# Patient Record
Sex: FEMALE | Race: WHITE | NOT HISPANIC OR LATINO | Employment: OTHER | URBAN - NONMETROPOLITAN AREA
[De-identification: names, ages, dates, MRNs, and addresses within clinical notes are randomized per-mention and may not be internally consistent; named-entity substitution may affect disease eponyms.]

---

## 2022-12-01 ENCOUNTER — LAB (OUTPATIENT)
Dept: LAB | Facility: HOSPITAL | Age: 42
End: 2022-12-01

## 2022-12-01 ENCOUNTER — OFFICE VISIT (OUTPATIENT)
Dept: OBSTETRICS AND GYNECOLOGY | Facility: CLINIC | Age: 42
End: 2022-12-01

## 2022-12-01 VITALS — SYSTOLIC BLOOD PRESSURE: 120 MMHG | WEIGHT: 142 LBS | DIASTOLIC BLOOD PRESSURE: 74 MMHG

## 2022-12-01 DIAGNOSIS — N93.9 ABNORMAL UTERINE BLEEDING (AUB): ICD-10-CM

## 2022-12-01 DIAGNOSIS — N88.2 CERVICAL STENOSIS (UTERINE CERVIX): ICD-10-CM

## 2022-12-01 DIAGNOSIS — N80.9 ENDOMETRIOSIS DETERMINED BY LAPAROSCOPY: ICD-10-CM

## 2022-12-01 DIAGNOSIS — N93.9 ABNORMAL UTERINE BLEEDING (AUB): Primary | ICD-10-CM

## 2022-12-01 LAB
BASOPHILS # BLD AUTO: 0.04 10*3/MM3 (ref 0–0.2)
BASOPHILS NFR BLD AUTO: 0.5 % (ref 0–1.5)
DEPRECATED RDW RBC AUTO: 37.7 FL (ref 37–54)
EOSINOPHIL # BLD AUTO: 0.15 10*3/MM3 (ref 0–0.4)
EOSINOPHIL NFR BLD AUTO: 1.9 % (ref 0.3–6.2)
ERYTHROCYTE [DISTWIDTH] IN BLOOD BY AUTOMATED COUNT: 12.3 % (ref 12.3–15.4)
HCT VFR BLD AUTO: 39.5 % (ref 34–46.6)
HGB BLD-MCNC: 13.4 G/DL (ref 12–15.9)
IMM GRANULOCYTES # BLD AUTO: 0.01 10*3/MM3 (ref 0–0.05)
IMM GRANULOCYTES NFR BLD AUTO: 0.1 % (ref 0–0.5)
LYMPHOCYTES # BLD AUTO: 2.2 10*3/MM3 (ref 0.7–3.1)
LYMPHOCYTES NFR BLD AUTO: 28.4 % (ref 19.6–45.3)
MCH RBC QN AUTO: 28 PG (ref 26.6–33)
MCHC RBC AUTO-ENTMCNC: 33.9 G/DL (ref 31.5–35.7)
MCV RBC AUTO: 82.6 FL (ref 79–97)
MONOCYTES # BLD AUTO: 0.59 10*3/MM3 (ref 0.1–0.9)
MONOCYTES NFR BLD AUTO: 7.6 % (ref 5–12)
NEUTROPHILS NFR BLD AUTO: 4.77 10*3/MM3 (ref 1.7–7)
NEUTROPHILS NFR BLD AUTO: 61.5 % (ref 42.7–76)
NRBC BLD AUTO-RTO: 0 /100 WBC (ref 0–0.2)
PLATELET # BLD AUTO: 292 10*3/MM3 (ref 140–450)
PMV BLD AUTO: 10.2 FL (ref 6–12)
RBC # BLD AUTO: 4.78 10*6/MM3 (ref 3.77–5.28)
TSH SERPL DL<=0.05 MIU/L-ACNC: 1.74 UIU/ML (ref 0.27–4.2)
WBC NRBC COR # BLD: 7.76 10*3/MM3 (ref 3.4–10.8)

## 2022-12-01 PROCEDURE — 36415 COLL VENOUS BLD VENIPUNCTURE: CPT

## 2022-12-01 PROCEDURE — 85025 COMPLETE CBC W/AUTO DIFF WBC: CPT

## 2022-12-01 PROCEDURE — 99243 OFF/OP CNSLTJ NEW/EST LOW 30: CPT | Performed by: OBSTETRICS & GYNECOLOGY

## 2022-12-01 PROCEDURE — 84443 ASSAY THYROID STIM HORMONE: CPT

## 2022-12-01 RX ORDER — ATORVASTATIN CALCIUM 10 MG/1
TABLET, FILM COATED ORAL
COMMUNITY
Start: 2022-11-24

## 2022-12-01 RX ORDER — TRANEXAMIC ACID 650 MG/1
1300 TABLET ORAL 3 TIMES DAILY
Qty: 30 TABLET | Refills: 11 | Status: SHIPPED | OUTPATIENT
Start: 2022-12-01 | End: 2022-12-06

## 2022-12-01 RX ORDER — MISOPROSTOL 200 UG/1
TABLET ORAL
Qty: 2 TABLET | Refills: 0 | Status: SHIPPED | OUTPATIENT
Start: 2022-12-01 | End: 2022-12-22

## 2022-12-02 NOTE — PROGRESS NOTES
[facility]  Gynecology Consult  Date of Service: 2022  Referring provider: MATTHEW Martinez    CC: Endometriosis    HPI  Leanne Ritchie is a 42 y.o.  premenopausal female who presents as a referral from MATTHEW Martinez secondary to endometriosis.    She states that she has had bad periods since she started at age 11.  She states that during her 1st surgery, endometriosis was confirmed.  She states that she did take medications for quite some time (DepoProvera, DepoLupron, BCP) but none helped; she states that the only thing that helped was Vioxx which was removed from the market.  She states that during the first 3-4 days of her period, she has debilitating bleeding.  She states that she will use a super tampon every 15 minutes.  She states that she has to set an alarm at night to wake up to change so that she doesn't saturate clothes or sheets.  She states that this has interrupted her work schedule.    She does have some pain with her periods but nothing compared to her bleeding.    ROS  Review of Systems   Constitutional: Negative.    HENT: Negative.    Eyes: Negative.    Respiratory: Negative.    Cardiovascular: Negative.    Gastrointestinal: Negative.    Endocrine: Negative.    Genitourinary: Positive for menstrual problem, pelvic pain and vaginal bleeding.   Musculoskeletal: Negative.    Skin: Negative.    Allergic/Immunologic: Negative.    Neurological: Negative.    Hematological: Negative.    Psychiatric/Behavioral: Negative.      GYN HISTORY  Menarche: age 11  Menses: Regular, every 28 days, lasts 5 days, first 3 days ++ heavy, no intermenstrual bleeding  History of STIs: None  Last pap smear:   Abnormal pap smear history: None  Contraception: None     OB HISTORY  OB History    Para Term  AB Living   10 1 1   9 1   SAB IAB Ectopic Molar Multiple Live Births   9         1      # Outcome Date GA Lbr Prateek/2nd Weight Sex Delivery Anes PTL Lv   10 Term  06 38w0d  3345 g (7 lb 6 oz) M CS-LTranv   MAICOL      Complications: Breech presentation   9 SAB            8 SAB            7 SAB            6 SAB            5 SAB            4 SAB            3 SAB            2 SAB            1 SAB              PAST MEDICAL HISTORY  Past Medical History:   Diagnosis Date   • Basal cell carcinoma    • Endometriosis determined by laparoscopy    • Hyperlipidemia    • Migraine    • PONV (postoperative nausea and vomiting)    • Varicella      PAST SURGICAL HISTORY  Past Surgical History:   Procedure Laterality Date   •  SECTION     • PELVIC LAPAROSCOPY     • PELVIC LAPAROSCOPY     • TONSILLECTOMY     • WISDOM TOOTH EXTRACTION       FAMILY HISTORY  Family History   Problem Relation Age of Onset   • Cancer Father    • Stomach cancer Father         GIST cancer   • Breast cancer Mother         mammary/ductal   • Stroke Mother    • Lupus Sister    • Endometriosis Sister    • Irritable bowel syndrome Son    • Eczema Son    • Ovarian cancer Neg Hx    • Uterine cancer Neg Hx      SOCIAL HISTORY  Social History     Socioeconomic History   • Marital status:    Tobacco Use   • Smoking status: Never   Vaping Use   • Vaping Use: Never used   Substance and Sexual Activity   • Alcohol use: Never   • Drug use: Never   • Sexual activity: Yes     Partners: Male     Birth control/protection: None     ALLERGIES  Allergies   Allergen Reactions   • Codeine Rash   • Tetracycline Palpitations   • Aspirin Other (See Comments)     Nose bleeds if taken for long periods of time pt reports     HOME MEDICATIONS  Prior to Admission medications    Medication Sig Start Date End Date Taking? Authorizing Provider   atorvastatin (LIPITOR) 10 MG tablet TAKE 1 TABLET BY MOUTH EVERY DAY FOR 90 DAYS 22  Yes Provider, Franci, MD RUTH  /74 (BP Location: Left arm, Patient Position: Sitting, Cuff Size: Adult)   Wt 64.4 kg (142 lb)   LMP 2022 (Exact Date)  "       General: Alert, healthy, no distress, well nourished and well developed.  Neurologic: Alert, oriented to person, place, and time.  Gait normal.  Cranial nerves II-XII grossly intact.  HEENT: Normocephalic, atraumatic.  Extraocular muscles intact, pupils equal and reactive times two.    Neck: Supple, no adenopathy, thyroid normal size, non-tender, without nodularity, trachea midline.  Lungs: Normal respiratory effort.  Clear to auscultation bilaterally.  No wheezes, rhonci, or rales.  Heart: Regular rate and rhythm.  No murmer, rub or gallop.  Abdomen: Soft, non-tender, non-distended,no masses, no hepatosplenomegaly, no hernia.  Skin: No rash, no lesions.  Extremities: No cyanosis, clubbing or edema.  PELVIC EXAM: Deferred to EMB    IMPRESSION  Leanne Ritchie is a 42 y.o.  presenting with AUB-E with prior endometriosis diagnosis.    PLAN    1. Abnormal uterine bleeding (AUB)  Discussed options for management of AUB including expectant management, medical management (OCPs, POPs, DepoProvera, Nexplanon, Mirena IUD, Orilissa, Myfembree), and surgical management (endometrial ablation, hysterectomy).  Reviewed pros/cons of each.  Declines hormonal management.  She is interested in TXA to take to \"lessen her periods.\"  - CBC & Differential; Future  - TSH; Future  - US Non-ob Transvaginal; Future  - Tranexamic Acid (Lysteda) 650 MG tablet; Take 2 tablets by mouth 3 (Three) Times a Day for 5 days.  Dispense: 30 tablet; Refill: 11  - RTC for TVUS/EMB    2. Endometriosis determined by laparoscopy    3. Cervical stenosis (uterine cervix)  - miSOPROStol (Cytotec) 200 MCG tablet; Place 1 tablet in the vagina 8 hours prior to biopsy; place 1 tablet in the vagina 4 hours prior to biopsy.  Dispense: 2 tablet; Refill: 0         Thank you for allowing me to participate in the care of this patient.  Please contact me with any questions or concerns.    This document has been electronically signed by Skyla Mccauley MD on " December 1, 2022 18:10 CST.    CC: Rosalba Leach APRN

## 2022-12-22 ENCOUNTER — PROCEDURE VISIT (OUTPATIENT)
Dept: OBSTETRICS AND GYNECOLOGY | Facility: CLINIC | Age: 42
End: 2022-12-22

## 2022-12-22 DIAGNOSIS — N93.9 ABNORMAL UTERINE BLEEDING (AUB): Primary | ICD-10-CM

## 2022-12-22 DIAGNOSIS — N80.9 ENDOMETRIOSIS DETERMINED BY LAPAROSCOPY: ICD-10-CM

## 2022-12-22 DIAGNOSIS — D25.9 UTERINE LEIOMYOMA, UNSPECIFIED LOCATION: ICD-10-CM

## 2022-12-22 DIAGNOSIS — Z32.02 PREGNANCY EXAMINATION OR TEST, NEGATIVE RESULT: ICD-10-CM

## 2022-12-22 DIAGNOSIS — Z12.4 ENCOUNTER FOR PAPANICOLAOU SMEAR FOR CERVICAL CANCER SCREENING: ICD-10-CM

## 2022-12-22 LAB
B-HCG UR QL: NEGATIVE
EXPIRATION DATE: NORMAL
INTERNAL NEGATIVE CONTROL: NORMAL
INTERNAL POSITIVE CONTROL: NORMAL
Lab: NORMAL

## 2022-12-22 PROCEDURE — 58100 BIOPSY OF UTERUS LINING: CPT | Performed by: OBSTETRICS & GYNECOLOGY

## 2022-12-22 PROCEDURE — 81025 URINE PREGNANCY TEST: CPT | Performed by: OBSTETRICS & GYNECOLOGY

## 2022-12-22 PROCEDURE — 99214 OFFICE O/P EST MOD 30 MIN: CPT | Performed by: OBSTETRICS & GYNECOLOGY

## 2022-12-22 PROCEDURE — 88305 TISSUE EXAM BY PATHOLOGIST: CPT

## 2022-12-22 RX ORDER — TRANEXAMIC ACID 650 MG/1
TABLET ORAL
Qty: 30 TABLET | Refills: 11 | Status: SHIPPED | OUTPATIENT
Start: 2022-12-22

## 2022-12-22 NOTE — PROGRESS NOTES
[facility]  Gynecology  Procedure Note: Endometrial Biopsy    Date of procedure: 12/22/2022    LMP: No LMP recorded.  UPT: negative    Procedure documentation:    The cervix was grasped anterior at the 12 o'clock position.  The cavity sounded to 7 centimeters.  An endometrial biopsy specimen was obtained; moderate tissue was obtained.  The tissue was sent for permanent histopathologic evaluation.  Tenaculum was removed from the cervix with scant bleeding.  She tolerated the procedure well.    Post procedure instructions: If there is any significant fever or excessive bleeding or pain she is to call immediately.    Skyla Mccauley MD  12/22/2022  15:15 CST

## 2022-12-22 NOTE — PROGRESS NOTES
[facility]  Gynecology   Date of Service: 2022     CC: US, EMB    HPI  Leanne Ritchie is a 42 y.o.  premenopausal female who presents for ultrasound and EMB.    She states that she has had bad periods since she started at age 11.  She states that during her 1st surgery, endometriosis was confirmed.  She states that she did take medications for quite some time (DepoProvera, DepoLupron, BCP) but none helped; she states that the only thing that helped was Vioxx which was removed from the market.  She states that during the first 3-4 days of her period, she has debilitating bleeding.  She states that she will use a super tampon every 15 minutes.  She states that she has to set an alarm at night to wake up to change so that she doesn't saturate clothes or sheets.  She states that this has interrupted her work schedule.  She does have some pain with her periods but nothing compared to her bleeding.    When seen earlier this month, patient was started on TXA (she declined hormonal management and wanted to avoid surgery if possible).  She states that it did help to decrease her flow but once she stopped it, she would have several days of clots.  She thinks she needs to wait a couple of days until she has heavier flow.    Prelim US-   Uterus 6.08 x 3.49 x 5.67 cm, retroverted, 2.47 x 2.65 x 2.24 cm right lateral fundal fibroid  R ovary 5.39 x 4.7 x 4.06 cm w/ 2 hemorrhagic cysts (3.6 x 4.1 x 4.35 cm, 2.1 x 2.0 x 2.3 cm)  L ovary 2.67 x 2.21 x 2.16 cm  No free fluid in the cul-de-sac    ROS  Review of Systems   Constitutional: Negative.    HENT: Negative.    Eyes: Negative.    Respiratory: Negative.    Cardiovascular: Negative.    Gastrointestinal: Negative.    Endocrine: Negative.    Genitourinary: Positive for menstrual problem, pelvic pain and vaginal bleeding.   Musculoskeletal: Negative.    Skin: Negative.    Allergic/Immunologic: Negative.    Neurological: Negative.    Hematological:  Negative.    Psychiatric/Behavioral: Negative.      GYN HISTORY  Menarche: age 11  Menses: Regular, every 28 days, lasts 5 days, first 3 days ++ heavy, no intermenstrual bleeding  History of STIs: None  Last pap smear:   Abnormal pap smear history: None  Contraception: None     OB HISTORY  OB History    Para Term  AB Living   10 1 1   9 1   SAB IAB Ectopic Molar Multiple Live Births   9         1      # Outcome Date GA Lbr Prateek/2nd Weight Sex Delivery Anes PTL Lv   10 Term 06 38w0d  3345 g (7 lb 6 oz) M CS-LTranv   MAICOL      Complications: Breech presentation   9 SAB            8 SAB            7 SAB            6 SAB            5 SAB            4 SAB            3 SAB            2 SAB            1 SAB              PAST MEDICAL HISTORY  Past Medical History:   Diagnosis Date   • Basal cell carcinoma    • Endometriosis determined by laparoscopy    • Hyperlipidemia    • Migraine    • PONV (postoperative nausea and vomiting)    • Varicella      PAST SURGICAL HISTORY  Past Surgical History:   Procedure Laterality Date   •  SECTION     • PELVIC LAPAROSCOPY     • PELVIC LAPAROSCOPY     • TONSILLECTOMY     • WISDOM TOOTH EXTRACTION       FAMILY HISTORY  Family History   Problem Relation Age of Onset   • Cancer Father    • Stomach cancer Father         GIST cancer   • Breast cancer Mother         mammary/ductal   • Stroke Mother    • Lupus Sister    • Endometriosis Sister    • Irritable bowel syndrome Son    • Eczema Son    • Ovarian cancer Neg Hx    • Uterine cancer Neg Hx      SOCIAL HISTORY  Social History     Socioeconomic History   • Marital status:    Tobacco Use   • Smoking status: Never   Vaping Use   • Vaping Use: Never used   Substance and Sexual Activity   • Alcohol use: Never   • Drug use: Never   • Sexual activity: Yes     Partners: Male     Birth control/protection: None     ALLERGIES  Allergies   Allergen Reactions   • Codeine Rash   •  Tetracycline Palpitations   • Aspirin Other (See Comments)     Nose bleeds if taken for long periods of time pt reports     HOME MEDICATIONS  Prior to Admission medications    Medication Sig Start Date End Date Taking? Authorizing Provider   atorvastatin (LIPITOR) 10 MG tablet TAKE 1 TABLET BY MOUTH EVERY DAY FOR 90 DAYS 11/24/22  Yes Provider, MD Franci   Tranexamic Acid (Lysteda) 650 MG tablet Take 2 tablets three times per day for 5 days each month. 12/22/22   Skyla Mccauley MD     PE  BP (P) 122/68 (BP Location: Left arm, Patient Position: Sitting, Cuff Size: Adult)   Wt (P) 65.4 kg (144 lb 3.2 oz)        General: Alert, healthy, no distress, well nourished and well developed.  Neurologic: Alert, oriented to person, place, and time.  Gait normal.  Cranial nerves II-XII grossly intact.  HEENT: Normocephalic, atraumatic.  Extraocular muscles intact, pupils equal and reactive times two.    Neck: Supple, no adenopathy, thyroid normal size, non-tender, without nodularity, trachea midline.  Lungs: Normal respiratory effort.  Clear to auscultation bilaterally.  No wheezes, rhonci, or rales.  Heart: Regular rate and rhythm.  No murmer, rub or gallop.  Abdomen: Soft, non-tender, non-distended,no masses, no hepatosplenomegaly, no hernia.  Skin: No rash, no lesions.  Extremities: No cyanosis, clubbing or edema.  External Genitalia/Vulva: Anatomy is normal, no significant redness of labia, no discharge on vulvar tissues, Clarksdale's and Bartholin's glands are normal, no ulcers, no condylomatous lesions.  Urethral meatus: Normal, no lesions, no prolapse.  Urethra: Normal, no masses, no tenderness with palpation.  Bladder: Normal, no fullness, no masses, no tenderness with palpation.  Vagina: Vaginal tissues are not inflamed, normal color and texture, no significant discharge present.  Pelvic support adequate.  Cervix: Normal, no lesions, no purulent discharge, no cervical motion tenderness.  Pap smear  obtained.  Uterus: Normal size, shape, and consistency.  Good mobility noted.  Minimal descent noted with good support.  Adnexa: Normal size and shape bilaterally, no palpable mass bilaterally and non-tender bilaterally.  Rectal: Normal, no masses or polyps, confirms bimanual exam, perianal normal, no lesions; AXEL deferred.    See procedure note for EMB    IMPRESSION  Leanne Ritchie is a 42 y.o.  presenting with AUB-E with prior endometriosis diagnosis.    PLAN    1. Abnormal uterine bleeding (AUB)  Patient wishes to continue TXA but start taking it on the heavier days.  She would like to try this for 3-4 months to see if this helps.  - TISSUE EXAM, [facility] ([facility]); Future  - TISSUE EXAM, [facility] ([facility])  - Tranexamic Acid (Lysteda) 650 MG tablet; Take 2 tablets three times per day for 5 days each month.  Dispense: 30 tablet; Refill: 11    2. Endometriosis determined by laparoscopy    3. Uterine leiomyoma, unspecified location    4. Encounter for Papanicolaou smear for cervical cancer screening  - LIQUID-BASED PAP SMEAR, [facility] ([facility]); Future  - LIQUID-BASED PAP SMEAR, [facility] ([facility])    5. Pregnancy examination or test, negative result  - POC Pregnancy, Urine     This document has been electronically signed by Skyla Mccauley MD on 2022 15:20 CST.

## 2022-12-27 LAB — REF LAB TEST METHOD: NORMAL

## 2022-12-28 LAB — REF LAB TEST METHOD: NORMAL

## 2023-03-21 ENCOUNTER — OFFICE VISIT (OUTPATIENT)
Dept: OBSTETRICS AND GYNECOLOGY | Facility: CLINIC | Age: 43
End: 2023-03-21
Payer: OTHER GOVERNMENT

## 2023-03-21 VITALS — DIASTOLIC BLOOD PRESSURE: 70 MMHG | WEIGHT: 146.4 LBS | SYSTOLIC BLOOD PRESSURE: 120 MMHG

## 2023-03-21 DIAGNOSIS — N93.9 ABNORMAL UTERINE BLEEDING (AUB): Primary | ICD-10-CM

## 2023-03-21 DIAGNOSIS — N80.9 ENDOMETRIOSIS DETERMINED BY LAPAROSCOPY: ICD-10-CM

## 2023-03-21 DIAGNOSIS — D25.9 UTERINE LEIOMYOMA, UNSPECIFIED LOCATION: ICD-10-CM

## 2023-03-21 PROCEDURE — 99214 OFFICE O/P EST MOD 30 MIN: CPT | Performed by: OBSTETRICS & GYNECOLOGY

## 2023-03-21 NOTE — PROGRESS NOTES
[facility]  Gynecology   Date of Service: 3/21/2023     CC: 3 month follow-up    HPI  Leanne Ritchie is a 42 y.o.  premenopausal female who presents for 3 month follow-up.    She was seen as a referral in 2022.  She states that she has had bad periods since she started at age 11.  She states that during her 1st surgery, endometriosis was confirmed.  She states that she did take medications for quite some time (DepoProvera, DepoLupron, BCP) but none helped; she states that the only thing that helped was Vioxx which was removed from the market.  She states that during the first 3-4 days of her period, she has debilitating bleeding.  She states that she will use a super tampon every 15 minutes.  She states that she has to set an alarm at night to wake up to change so that she doesn't saturate clothes or sheets.  She states that this has interrupted her work schedule.  She does have some pain with her periods but nothing compared to her bleeding.  She was was started on TXA (she declined hormonal management and wanted to avoid surgery if possible), which did help to decrease her flow but once she stopped it, she would have several days of clots.  She thinks she needs to wait a couple of days until she has heavier flow.    22 14:44  TSH Baseline: 1.740  Hemoglobin: 13.4    TVUS:  Uterus 6.08 x 3.49 x 5.67 cm, retroverted, 2.47 x 2.65 x 2.24 cm right lateral fundal fibroid  R ovary 5.39 x 4.7 x 4.06 cm w/ 2 hemorrhagic cysts (3.6 x 4.1 x 4.35 cm, 2.1 x 2.0 x 2.3 cm)  L ovary 2.67 x 2.21 x 2.16 cm  No free fluid in the cul-de-sac    EMB: Benign    She presents today for 3 month follow-up after adjusting when she takes the Lysteda.  She starts it day #3 and feels it is extending her periods.  She states that she still has heavy periods and it didn't help like she was hoping that it would.    ROS  Review of Systems   Constitutional: Negative.    HENT: Negative.    Eyes:  Negative.    Respiratory: Negative.    Cardiovascular: Negative.    Gastrointestinal: Negative.    Endocrine: Negative.    Genitourinary: Positive for menstrual problem, pelvic pain and vaginal bleeding.   Musculoskeletal: Negative.    Skin: Negative.    Allergic/Immunologic: Negative.    Neurological: Negative.    Hematological: Negative.    Psychiatric/Behavioral: Negative.      GYN HISTORY  Menarche: age 11  Menses: Regular, every 28 days, lasts 5 days, first 3 days ++ heavy, no intermenstrual bleeding  History of STIs: None  Last pap smear:   Abnormal pap smear history: None  Contraception: None     OB HISTORY  OB History    Para Term  AB Living   10 1 1   9 1   SAB IAB Ectopic Molar Multiple Live Births   9         1      # Outcome Date GA Lbr Prateek/2nd Weight Sex Delivery Anes PTL Lv   10 Term 06 38w0d  3345 g (7 lb 6 oz) M CS-LTranv   MAICOL      Complications: Breech presentation   9 SAB            8 SAB            7 SAB            6 SAB            5 SAB            4 SAB            3 SAB            2 SAB            1 SAB              PAST MEDICAL HISTORY  Past Medical History:   Diagnosis Date   • Basal cell carcinoma    • Endometriosis determined by laparoscopy    • Hyperlipidemia    • Migraine    • PONV (postoperative nausea and vomiting)    • Varicella      PAST SURGICAL HISTORY  Past Surgical History:   Procedure Laterality Date   •  SECTION     • PELVIC LAPAROSCOPY     • PELVIC LAPAROSCOPY     • TONSILLECTOMY     • WISDOM TOOTH EXTRACTION       FAMILY HISTORY  Family History   Problem Relation Age of Onset   • Cancer Father    • Stomach cancer Father         GIST cancer   • Breast cancer Mother         mammary/ductal   • Stroke Mother    • Lupus Sister    • Endometriosis Sister    • Irritable bowel syndrome Son    • Eczema Son    • Ovarian cancer Neg Hx    • Uterine cancer Neg Hx      SOCIAL HISTORY  Social History     Socioeconomic History   •  Marital status:    Tobacco Use   • Smoking status: Never   Vaping Use   • Vaping Use: Never used   Substance and Sexual Activity   • Alcohol use: Never   • Drug use: Never   • Sexual activity: Yes     Partners: Male     Birth control/protection: None     ALLERGIES  Allergies   Allergen Reactions   • Codeine Rash   • Tetracycline Palpitations   • Aspirin Other (See Comments)     Nose bleeds if taken for long periods of time pt reports     HOME MEDICATIONS  Prior to Admission medications    Medication Sig Start Date End Date Taking? Authorizing Provider   atorvastatin (LIPITOR) 10 MG tablet TAKE 1 TABLET BY MOUTH EVERY DAY FOR 90 DAYS 22  Yes Provider, MD Franci   Tranexamic Acid (Lysteda) 650 MG tablet Take 2 tablets three times per day for 5 days each month. 22   Skyla Mccauley MD     PE  /70 (BP Location: Left arm, Patient Position: Sitting, Cuff Size: Adult)   Wt 66.4 kg (146 lb 6.4 oz)   LMP 2023 (Exact Date)        General: Alert, healthy, no distress, well nourished and well developed.  Neurologic: Alert, oriented to person, place, and time.  Gait normal.  Cranial nerves II-XII grossly intact.  HEENT: Normocephalic, atraumatic.  Extraocular muscles intact, pupils equal and reactive times two.    Neck: Supple, no adenopathy, thyroid normal size, non-tender, without nodularity, trachea midline.  Lungs: Normal respiratory effort.  Clear to auscultation bilaterally.  No wheezes, rhonci, or rales.  Heart: Regular rate and rhythm.  No murmer, rub or gallop.  Abdomen: Soft, non-tender, non-distended,no masses, no hepatosplenomegaly, no hernia.  Skin: No rash, no lesions.  Extremities: No cyanosis, clubbing or edema.    IMPRESSION  Leanne Ritchie is a 42 y.o.  presenting with AUB-E with prior endometriosis diagnosis.  Minimal improvement w/ Lysteda.  Patient declines hormonal options and surgical options.  Discussed that endometriosis and AUB are  hormonally driven diseases which is how we correct them outside of surgery.  She does not want BC.  Discussed GnRH agonists which affects hormones but is not a hormone.  Discussed using NSAIDs at higher doses.  She is considering Myfembre vs Orilissa.  Information given; she will let us know what she would like to do.    PLAN    1. Abnormal uterine bleeding (AUB)    2. Endometriosis determined by laparoscopy    3. Uterine leiomyoma, unspecified location    This document has been electronically signed by Skyla Mccauley MD on March 21, 2023 09:47 CDT.